# Patient Record
Sex: FEMALE | Race: WHITE | NOT HISPANIC OR LATINO | URBAN - NONMETROPOLITAN AREA
[De-identification: names, ages, dates, MRNs, and addresses within clinical notes are randomized per-mention and may not be internally consistent; named-entity substitution may affect disease eponyms.]

---

## 2017-07-11 PROBLEM — Z46.0: Noted: 2017-07-11

## 2017-07-11 PROBLEM — Z01.00: Noted: 2017-07-11

## 2017-07-11 PROBLEM — H25.813: Noted: 2020-06-23

## 2017-07-11 PROBLEM — H52.13: Noted: 2017-07-11

## 2020-03-03 ENCOUNTER — IMPORTED ENCOUNTER (OUTPATIENT)
Dept: URBAN - NONMETROPOLITAN AREA CLINIC 1 | Facility: CLINIC | Age: 40
End: 2020-03-03

## 2020-03-03 PROCEDURE — 92310 CONTACT LENS FITTING OU: CPT

## 2020-03-03 PROCEDURE — 92014 COMPRE OPH EXAM EST PT 1/>: CPT

## 2020-03-03 PROCEDURE — 92015 DETERMINE REFRACTIVE STATE: CPT

## 2020-03-03 NOTE — PATIENT DISCUSSION
Myopia-Discussed diagnosis with patient. -Explained that people who are myopic are at a higher risk for developing RD/RT and reviewed associated S&S.-Pt to contact our office if symptoms develop. Updated spec Rx given. Updated CL Rx given. [place] OD fluid noted -Discussed w/pt -recommend pt see retina Cataract(s)-Visually significant.-Cataract(s) causing symptomatic impairment of visual function not correctable with a tolerable change in glasses or contact lenses lighting or non-operative means resulting in specific activity limitations and/or participation restrictions including but not limited to reading viewing television driving or meeting vocational or recreational needs. -Expectation is clearer vision and reduced glare disability after cataract removal.-Refer to Dr Gagandeep Rock for cataract evaluation; [facility]'s Notes: [facility]Francis Marylene Ba

## 2021-07-15 ENCOUNTER — IMPORTED ENCOUNTER (OUTPATIENT)
Dept: URBAN - NONMETROPOLITAN AREA CLINIC 1 | Facility: CLINIC | Age: 41
End: 2021-07-15

## 2021-07-15 PROBLEM — H33.321: Noted: 2021-07-15

## 2021-07-15 PROBLEM — H35.413: Noted: 2021-07-15

## 2021-07-15 PROBLEM — H52.13: Noted: 2021-07-15

## 2021-07-15 PROBLEM — H25.813: Noted: 2021-07-15

## 2021-07-15 PROCEDURE — 92015 DETERMINE REFRACTIVE STATE: CPT

## 2021-07-15 PROCEDURE — 92014 COMPRE OPH EXAM EST PT 1/>: CPT

## 2021-07-15 PROCEDURE — 92310 CONTACT LENS FITTING OU: CPT

## 2021-07-15 PROCEDURE — 92134 CPTRZ OPH DX IMG PST SGM RTA: CPT

## 2021-07-15 PROCEDURE — V2520 CONTACT LENS HYDROPHILIC: HCPCS

## 2021-07-15 NOTE — PATIENT DISCUSSION
Myopia-Discussed diagnosis with patient. -Explained that people who are myopic are at a higher risk for developing RD/RT and reviewed associated S&S.-Pt to contact our office if symptoms develop. Updated spec Rx given. Updated CL Rx given. [place] OD/Lattice OU-Discussed w/pt -Barrier laser OD stable at this time -Order OCT MAC today Cataract OU-Not yet surgical. -Reviewed symptoms of advancing cataract growth such as glare and halos and decreased vision.-Continue to monitor for now.  Pt will notify us if any new symptoms develop.; 's Notes: [facility].[employer]

## 2022-04-09 ASSESSMENT — TONOMETRY
OD_IOP_MMHG: 15
OS_IOP_MMHG: 15
OD_IOP_MMHG: 15
OS_IOP_MMHG: 15

## 2022-04-09 ASSESSMENT — VISUAL ACUITY
OS_SC: 20/20
OD_SC: 20/30+4
OD_SC: 20/20
OS_SC: 20/20

## 2024-09-25 ENCOUNTER — COMPREHENSIVE EXAM (OUTPATIENT)
Dept: RURAL CLINIC 2 | Facility: CLINIC | Age: 44
End: 2024-09-25

## 2024-09-25 DIAGNOSIS — H35.413: ICD-10-CM

## 2024-09-25 DIAGNOSIS — H25.813: ICD-10-CM

## 2024-09-25 DIAGNOSIS — H33.321: ICD-10-CM

## 2024-09-25 DIAGNOSIS — H52.13: ICD-10-CM

## 2024-09-25 PROCEDURE — 92134 CPTRZ OPH DX IMG PST SGM RTA: CPT

## 2024-09-25 PROCEDURE — 92015 DETERMINE REFRACTIVE STATE: CPT

## 2024-09-25 PROCEDURE — 92014 COMPRE OPH EXAM EST PT 1/>: CPT

## 2024-09-25 PROCEDURE — 92310-E CONTACT LENS FITTING ESTABLISH PATIENT
